# Patient Record
Sex: FEMALE | HISPANIC OR LATINO | URBAN - METROPOLITAN AREA
[De-identification: names, ages, dates, MRNs, and addresses within clinical notes are randomized per-mention and may not be internally consistent; named-entity substitution may affect disease eponyms.]

---

## 2018-08-15 ENCOUNTER — OFFICE VISIT (OUTPATIENT)
Dept: URBAN - METROPOLITAN AREA CLINIC 17 | Facility: CLINIC | Age: 68
End: 2018-08-15
Payer: COMMERCIAL

## 2018-08-15 DIAGNOSIS — H25.12 AGE-RELATED NUCLEAR CATARACT, LEFT EYE: ICD-10-CM

## 2018-08-15 DIAGNOSIS — H40.2234 CHRONIC ANGLE-CLOSURE GLAUCOMA, BI, INDETERMINATE STAGE: ICD-10-CM

## 2018-08-15 PROCEDURE — 99214 OFFICE O/P EST MOD 30 MIN: CPT | Performed by: OPTOMETRIST

## 2018-08-15 PROCEDURE — 92133 CPTRZD OPH DX IMG PST SGM ON: CPT | Performed by: OPTOMETRIST

## 2018-08-15 PROCEDURE — 92083 EXTENDED VISUAL FIELD XM: CPT | Performed by: OPTOMETRIST

## 2018-08-15 RX ORDER — TIMOLOL MALEATE 5 MG/ML
0.5 % SOLUTION/ DROPS OPHTHALMIC
Qty: 3 | Refills: 3 | Status: INACTIVE
Start: 2018-08-15 | End: 2019-01-09

## 2018-08-15 RX ORDER — TRAVOPROST 0.04 MG/ML
0.004 % SOLUTION/ DROPS OPHTHALMIC
Qty: 2.5 | Refills: 5 | Status: INACTIVE
Start: 2018-08-15 | End: 2019-01-09

## 2018-08-15 ASSESSMENT — INTRAOCULAR PRESSURE
OD: 10
OD: 11
OS: 13
OS: 12

## 2018-08-15 NOTE — IMPRESSION/PLAN
Impression: Chronic angle-closure glaucoma, bilateral, severe stage: F24.2786. Goal upper-teens IOP lower OU Plan: Continue Travatan OU QHS and Timolol OU QAM(Erx'd today).  Org IOP24/24, Hor IOP: 16/19, OCT 78/77(79/77)(81/78), VF OD sup inf para OS inf para BJW

## 2019-01-09 ENCOUNTER — OFFICE VISIT (OUTPATIENT)
Dept: URBAN - METROPOLITAN AREA CLINIC 17 | Facility: CLINIC | Age: 69
End: 2019-01-09
Payer: COMMERCIAL

## 2019-01-09 DIAGNOSIS — H40.2233 CHRONIC ANGLE-CLOSURE GLAUCOMA, BILATERAL, SEVERE STAGE: Primary | ICD-10-CM

## 2019-01-09 PROCEDURE — 99213 OFFICE O/P EST LOW 20 MIN: CPT | Performed by: OPTOMETRIST

## 2019-01-09 RX ORDER — TIMOLOL MALEATE 5 MG/ML
0.5 % SOLUTION/ DROPS OPHTHALMIC
Qty: 3 | Refills: 3 | Status: INACTIVE
Start: 2019-01-09 | End: 2020-02-04

## 2019-01-09 RX ORDER — TRAVOPROST 0.04 MG/ML
0.004 % SOLUTION/ DROPS OPHTHALMIC
Qty: 3 | Refills: 3 | Status: INACTIVE
Start: 2019-01-09 | End: 2020-02-04

## 2019-01-09 ASSESSMENT — INTRAOCULAR PRESSURE
OS: 19
OD: 16

## 2019-01-09 NOTE — IMPRESSION/PLAN
Impression: Chronic angle-closure glaucoma, bilateral, severe stage: H94.8311. Goal upper-teens IOP high OU today due to patient running out of Travatan x 1 week  Plan: Continue Travatan OU QHS and Timolol OU QAM. Erx'd both drops today Orig IOP24/24, Hor IOP: 16/19, OCT 78/77(79/77)(81/78), VF OD sup inf para OS inf para BJW

## 2019-02-06 ENCOUNTER — OFFICE VISIT (OUTPATIENT)
Dept: URBAN - METROPOLITAN AREA CLINIC 17 | Facility: CLINIC | Age: 69
End: 2019-02-06
Payer: COMMERCIAL

## 2019-02-06 PROCEDURE — 99213 OFFICE O/P EST LOW 20 MIN: CPT | Performed by: OPTOMETRIST

## 2019-02-06 ASSESSMENT — INTRAOCULAR PRESSURE
OD: 13
OS: 15

## 2019-02-06 NOTE — IMPRESSION/PLAN
Impression: Chronic angle-closure glaucoma, bilateral, severe stage: R61.7166. Goal upper-teens IOP lower OU Plan: Continue Travatan OU QHS and Timolol OU QAM. Orig IOP24/24, Hor IOP: 16/19, OCT 78/77(79/77)(81/78), VF OD sup inf para OS inf para BJW

## 2019-05-29 ENCOUNTER — OFFICE VISIT (OUTPATIENT)
Dept: URBAN - METROPOLITAN AREA CLINIC 17 | Facility: CLINIC | Age: 69
End: 2019-05-29
Payer: COMMERCIAL

## 2019-05-29 PROCEDURE — 99213 OFFICE O/P EST LOW 20 MIN: CPT | Performed by: OPTOMETRIST

## 2019-05-29 ASSESSMENT — INTRAOCULAR PRESSURE
OS: 18
OD: 15

## 2019-05-29 NOTE — IMPRESSION/PLAN
Impression: Chronic angle-closure glaucoma, bilateral, severe stage: O77.0546. Goal upper-teens IOP slightly elevated Plan: Continue Travatan OU QHS and Timolol OU QAM. Pt to have VF 24-2  and RNFL OCT.  
Orig IOP 24/24, Hor IOP: 16/19, OCT 78/77(79/77)(81/78), VF OD sup inf para OS inf para BJW

## 2019-08-28 ENCOUNTER — OFFICE VISIT (OUTPATIENT)
Dept: URBAN - METROPOLITAN AREA CLINIC 17 | Facility: CLINIC | Age: 69
End: 2019-08-28
Payer: COMMERCIAL

## 2019-08-28 PROCEDURE — 92083 EXTENDED VISUAL FIELD XM: CPT | Performed by: OPTOMETRIST

## 2019-08-28 PROCEDURE — 99214 OFFICE O/P EST MOD 30 MIN: CPT | Performed by: OPTOMETRIST

## 2019-08-28 PROCEDURE — 92133 CPTRZD OPH DX IMG PST SGM ON: CPT | Performed by: OPTOMETRIST

## 2019-08-28 ASSESSMENT — INTRAOCULAR PRESSURE
OD: 15
OS: 15

## 2019-08-28 NOTE — IMPRESSION/PLAN
Impression: Presence of intraocular lens: Z96.1. OD. Plan: PC IOL (s) in good position. Will continue to observe condition and or symptoms.

## 2019-08-28 NOTE — IMPRESSION/PLAN
Impression: Chronic angle-closure glaucoma, bilateral, severe stage: I71.9425. Goal upper-teens IOP lower ou  Plan: Continue Travatan OU QHS and Timolol OU QAM. Pt to have VF 24-2  and RNFL OCT.  
Orig IOP 24/24, Hor IOP: 16/19, OCT- 74/75? (78/77)(79/77)(81/78), VF- OD sup arc philly para OS sup inf arc para BJW

## 2020-01-08 ENCOUNTER — OFFICE VISIT (OUTPATIENT)
Dept: URBAN - METROPOLITAN AREA CLINIC 17 | Facility: CLINIC | Age: 70
End: 2020-01-08
Payer: COMMERCIAL

## 2020-01-08 DIAGNOSIS — Z96.1 PRESENCE OF INTRAOCULAR LENS: ICD-10-CM

## 2020-01-08 PROCEDURE — 99213 OFFICE O/P EST LOW 20 MIN: CPT | Performed by: OPTOMETRIST

## 2020-01-08 ASSESSMENT — INTRAOCULAR PRESSURE
OS: 14
OD: 13

## 2020-01-08 NOTE — IMPRESSION/PLAN
Impression: Chronic angle-closure glaucoma, bilateral, severe stage: D97.0572. Goal upper-teens IOP stable OU Plan: Continue Travatan OU QHS and Timolol OU QAM. Pt to have VF 24-2  and RNFL OCT.  
Orig IOP 24/24, Hor IOP: 16/19, OCT- 74/75? (78/77)(79/77)(81/78), VF- OD sup arc philly para OS sup inf arc para BJW

## 2022-01-05 ENCOUNTER — OFFICE VISIT (OUTPATIENT)
Dept: URBAN - METROPOLITAN AREA CLINIC 17 | Facility: CLINIC | Age: 72
End: 2022-01-05
Payer: COMMERCIAL

## 2022-01-05 PROCEDURE — 99204 OFFICE O/P NEW MOD 45 MIN: CPT | Performed by: OPHTHALMOLOGY

## 2022-01-05 PROCEDURE — 92133 CPTRZD OPH DX IMG PST SGM ON: CPT | Performed by: OPHTHALMOLOGY

## 2022-01-05 RX ORDER — TIMOLOL MALEATE 5 MG/ML
0.5 % SOLUTION/ DROPS OPHTHALMIC
Qty: 10 | Refills: 5 | Status: INACTIVE
Start: 2022-01-05 | End: 2022-02-09

## 2022-01-05 RX ORDER — LATANOPROST 50 UG/ML
0.005 % SOLUTION OPHTHALMIC
Qty: 7.5 | Refills: 4 | Status: INACTIVE
Start: 2022-01-05 | End: 2022-02-09

## 2022-01-05 ASSESSMENT — INTRAOCULAR PRESSURE
OS: 19
OD: 16

## 2022-01-05 NOTE — IMPRESSION/PLAN
Impression: Chronic angle-closure glaucoma, bilateral, severe stage: V02.0362. RD OD PCIOL OD
CCT average OU Enlarged cupping IOP elevated OU Plan: Discussed diagnosis, explained and understood by patient. Discussed IOP/ONH/Glaucoma management and risks. OCT ordered and reviewed today. Continue timolol qam ou. Has not been using travatan in over 2 yrs. Discussed adding drops vs laser to lower IOP. Patient elects drops. Start latanoprost qhs ou. Discussed side effects of drops.

## 2022-02-09 ENCOUNTER — OFFICE VISIT (OUTPATIENT)
Dept: URBAN - METROPOLITAN AREA CLINIC 17 | Facility: CLINIC | Age: 72
End: 2022-02-09
Payer: COMMERCIAL

## 2022-02-09 DIAGNOSIS — H04.123 DRY EYE SYNDROME OF BILATERAL LACRIMAL GLANDS: ICD-10-CM

## 2022-02-09 PROCEDURE — 99214 OFFICE O/P EST MOD 30 MIN: CPT | Performed by: OPTOMETRIST

## 2022-02-09 PROCEDURE — 92083 EXTENDED VISUAL FIELD XM: CPT | Performed by: OPTOMETRIST

## 2022-02-09 RX ORDER — TIMOLOL MALEATE 5 MG/ML
0.5 % SOLUTION/ DROPS OPHTHALMIC
Qty: 5 | Refills: 4 | Status: ACTIVE
Start: 2022-02-09

## 2022-02-09 RX ORDER — LATANOPROST 50 UG/ML
0.005 % SOLUTION OPHTHALMIC
Qty: 2.5 | Refills: 4 | Status: ACTIVE
Start: 2022-02-09

## 2022-02-09 ASSESSMENT — INTRAOCULAR PRESSURE
OS: 18
OD: 16

## 2022-02-09 NOTE — IMPRESSION/PLAN
Impression: Chronic angle-closure glaucoma, bilateral, severe stage: R56.5550. Plan: Pt has Chronic angle-closure glaucoma, bilateral, severe stage Gonio:
Pachs: [id number] Today's IOP: 16/18     Tmax : 21/24 Target IOP low to mid teens. Pt denies Family HX of Glaucoma. (Last VF & date & findings) sup arc /progression OD :  inf arc/ progression OS. v. C/D: .9/.65
OCT:
Pt denies Sulfa Allergy // Pt denies Lung / Heart DX. Plan: 1. Continue using current medication(s), take medication(s) as written and medication(s) refill given today. Latanoprost QHS OU and Timolol BID OU.

## 2022-06-08 ENCOUNTER — OFFICE VISIT (OUTPATIENT)
Dept: URBAN - METROPOLITAN AREA CLINIC 17 | Facility: CLINIC | Age: 72
End: 2022-06-08
Payer: COMMERCIAL

## 2022-06-08 DIAGNOSIS — H04.123 DRY EYE SYNDROME OF BILATERAL LACRIMAL GLANDS: ICD-10-CM

## 2022-06-08 DIAGNOSIS — H35.3121 NONEXUDATIVE AGE-RELATED MACULAR DEGENERATION OF LEFT EYE, EARLY DRY STAGE: ICD-10-CM

## 2022-06-08 DIAGNOSIS — H43.812 VITREOUS DEGENERATION, LEFT EYE: ICD-10-CM

## 2022-06-08 DIAGNOSIS — H40.2233 CHRONIC ANGLE-CLOSURE GLAUCOMA, BILATERAL, SEVERE STAGE: ICD-10-CM

## 2022-06-08 DIAGNOSIS — E11.9 TYPE 2 DIABETES MELLITUS W/O COMPLICATION: Primary | ICD-10-CM

## 2022-06-08 DIAGNOSIS — Z96.1 PRESENCE OF INTRAOCULAR LENS: ICD-10-CM

## 2022-06-08 DIAGNOSIS — H25.812 COMBINED FORMS OF AGE-RELATED CATARACT OF LEFT EYE: ICD-10-CM

## 2022-06-08 DIAGNOSIS — H35.3112 NONEXUDATIVE AGE-RELATED MACULAR DEGENERATION OF RIGHT EYE, INTERMEDIATE DRY STAGE: ICD-10-CM

## 2022-06-08 PROCEDURE — 99214 OFFICE O/P EST MOD 30 MIN: CPT | Performed by: OPTOMETRIST

## 2022-06-08 RX ORDER — LATANOPROST 50 UG/ML
0.005 % SOLUTION OPHTHALMIC
Qty: 2.5 | Refills: 4 | Status: ACTIVE
Start: 2022-06-08

## 2022-06-08 RX ORDER — TIMOLOL MALEATE 5 MG/ML
0.5 % SOLUTION/ DROPS OPHTHALMIC
Qty: 5 | Refills: 4 | Status: ACTIVE
Start: 2022-06-08

## 2022-06-08 ASSESSMENT — INTRAOCULAR PRESSURE
OD: 13
OS: 14

## 2022-06-08 NOTE — IMPRESSION/PLAN
Impression: Presence of intraocular lens: Z96.1. Plan: Discussed diagnosis in detail with patient. No treatment is required at this time. Will continue to observe condition and or symptoms. Patient instructed to call if condition gets worse or VA worsens.

## 2022-06-08 NOTE — IMPRESSION/PLAN
Impression: Type 2 diabetes mellitus w/o complication: U77.4. Plan: No diabetic retinopathy today. Discussed importance of closely monitoring and controlling glucose to minimize risks of progression of disease. Patient instructed to notify clinic immediately if changes to vision are noted.

## 2022-06-08 NOTE — IMPRESSION/PLAN
Impression: Vitreous degeneration, left eye: H43.232. Plan: Discussed diagnosis in detail with patient. There is no evidence of retinal pathology. No treatment is required at this time. Will continue to observe condition and or symptoms. Discussed signs and symptoms of PVD/floaters. Patient instructed to call if condition gets worse.

## 2022-06-08 NOTE — IMPRESSION/PLAN
Impression: Chronic angle-closure glaucoma, bilateral, severe stage: B50.3563. Plan: Pt has Chronic angle-closure glaucoma, bilateral, severe stage Gonio:
Pachs: [id number] Today's IOP: 16/18     Tmax : 21/24 Target IOP low to mid teens. Pt denies Family HX of Glaucoma. (Last VF & date & findings) sup arc /progression OD :  inf arc/ progression OS. v. C/D: .9/.65
OCT:
Pt denies Sulfa Allergy // Pt denies Lung / Heart DX. Plan: 1. Continue using current medication(s), take medication(s) as written and medication(s) refill given today. Latanoprost QHS OU and Timolol BID OU.

## 2022-06-08 NOTE — IMPRESSION/PLAN
Impression: Nonexudative age-related macular degeneration of left eye, early dry stage: H35.3121. Plan: Discussed diagnosis in detail with patient. Discussed treatment options with patient. Emphasized and explained compliance pt advised to not Re/start smoking. Recommended to start PreserVision AREDS-2 PO BID. Advised patient of condition. Discussed risks and benefits and patient understands.

## 2022-06-08 NOTE — IMPRESSION/PLAN
Impression: Nonexudative age-related macular degeneration of right eye, intermediate dry stage: H35.3112. Plan: Discussed diagnosis in detail with patient. Discussed treatment options with patient. Emphasized and explained compliance pt advised to not Re/start smoking. Recommended to start PreserVision AREDS-2 PO BID. Advised patient of condition. Discussed risks and benefits and patient understands.

## 2022-09-07 NOTE — IMPRESSION/PLAN
Impression: Presence of intraocular lens: Z96.1. OD. Plan: Will continue to observe condition and or symptoms. Render Risk Assessment In Note?: no Comment: Patient instructed to return to office if rash recurs Detail Level: Detailed

## 2023-11-09 ENCOUNTER — OFFICE VISIT (OUTPATIENT)
Dept: URBAN - METROPOLITAN AREA CLINIC 17 | Facility: CLINIC | Age: 73
End: 2023-11-09
Payer: COMMERCIAL

## 2023-11-09 DIAGNOSIS — H25.812 COMBINED FORMS OF AGE-RELATED CATARACT, LEFT EYE: Primary | ICD-10-CM

## 2023-11-09 DIAGNOSIS — Z96.1 PRESENCE OF INTRAOCULAR LENS: ICD-10-CM

## 2023-11-09 PROCEDURE — 99214 OFFICE O/P EST MOD 30 MIN: CPT | Performed by: OPHTHALMOLOGY

## 2023-11-09 RX ORDER — OFLOXACIN 3 MG/ML
0.3 % SOLUTION/ DROPS OPHTHALMIC
Qty: 5 | Refills: 1 | Status: ACTIVE
Start: 2023-11-09

## 2023-11-09 RX ORDER — PREDNISOLONE ACETATE 10 MG/ML
1 % SUSPENSION/ DROPS OPHTHALMIC
Qty: 10 | Refills: 1 | Status: ACTIVE
Start: 2023-11-09

## 2023-11-09 ASSESSMENT — KERATOMETRY
OS: 45.63
OD: 45.13

## 2023-11-09 ASSESSMENT — VISUAL ACUITY: OS: 20/50

## 2023-11-09 ASSESSMENT — INTRAOCULAR PRESSURE
OD: 14
OS: 18

## 2023-11-16 ENCOUNTER — Encounter (OUTPATIENT)
Dept: URBAN - METROPOLITAN AREA CLINIC 17 | Facility: CLINIC | Age: 73
End: 2023-11-16
Payer: COMMERCIAL

## 2023-11-28 ENCOUNTER — SURGERY (OUTPATIENT)
Dept: URBAN - METROPOLITAN AREA SURGERY 7 | Facility: SURGERY | Age: 73
End: 2023-11-28
Payer: COMMERCIAL

## 2023-11-28 PROCEDURE — 66984 XCAPSL CTRC RMVL W/O ECP: CPT | Performed by: OPHTHALMOLOGY

## 2023-11-29 ENCOUNTER — POST-OPERATIVE VISIT (OUTPATIENT)
Dept: URBAN - METROPOLITAN AREA CLINIC 17 | Facility: CLINIC | Age: 73
End: 2023-11-29
Payer: COMMERCIAL

## 2023-11-29 DIAGNOSIS — Z48.810 ENCOUNTER FOR SURGICAL AFTERCARE FOLLOWING SURGERY ON A SENSE ORGAN: Primary | ICD-10-CM

## 2023-11-29 PROCEDURE — 99024 POSTOP FOLLOW-UP VISIT: CPT | Performed by: OPTOMETRIST

## 2023-11-29 ASSESSMENT — INTRAOCULAR PRESSURE: OS: 28

## 2023-12-06 ENCOUNTER — POST-OPERATIVE VISIT (OUTPATIENT)
Dept: URBAN - METROPOLITAN AREA CLINIC 17 | Facility: CLINIC | Age: 73
End: 2023-12-06
Payer: COMMERCIAL

## 2023-12-06 DIAGNOSIS — Z96.1 PRESENCE OF INTRAOCULAR LENS: Primary | ICD-10-CM

## 2023-12-06 PROCEDURE — 99024 POSTOP FOLLOW-UP VISIT: CPT | Performed by: OPTOMETRIST

## 2023-12-06 ASSESSMENT — INTRAOCULAR PRESSURE: OS: 21

## 2024-01-05 ENCOUNTER — POST-OPERATIVE VISIT (OUTPATIENT)
Dept: URBAN - METROPOLITAN AREA CLINIC 17 | Facility: CLINIC | Age: 74
End: 2024-01-05
Payer: COMMERCIAL

## 2024-01-05 PROCEDURE — 99024 POSTOP FOLLOW-UP VISIT: CPT

## 2024-01-05 ASSESSMENT — VISUAL ACUITY: OS: 20/30

## 2024-01-05 ASSESSMENT — INTRAOCULAR PRESSURE
OS: 15
OD: 15